# Patient Record
(demographics unavailable — no encounter records)

---

## 2024-12-18 NOTE — CONSULT LETTER
[Dear  ___] : Dear  [unfilled], [FreeTextEntry1] : I had the pleasure of evaluating your patient, JENIFFER GOFF , in the office today.  Please review my consultation and evaluation report that follows below.  Please do not hesitate to call me if further information is necessary or if you wish to discuss ongoing care or diagnostic work-up.    I very much appreciate your referral and it is a privilege to be able to provide care for your patient.  Sincerely,   Jose Terrazas MD, MHCM, FACP, JAYANT-C Pulmonary Medicine  of Medicine [place] nataliya [facility] of Medicine at [facility] [email or web address] in [address] [place]  [address]    Fax   [facility] at [address]  [phone]

## 2024-12-18 NOTE — HISTORY OF PRESENT ILLNESS
[TextBox_4] : 69 yo woman with COPD, currently treated with symbicort and albuterol Also has AICD, cardiomyopathy and recent visit to [facility] for dyspnea and dizziness After last seen, patient was evaluated and stablized by cardiology and EP for bradycardia  Last here in June and was doing well Saw cards last in August, for a new visit soon  Using symbicort regularly 2x2, and uses albuterol perhaps once a day  Generally, good spirits and doing well Her daughter is pleased with her course No cough sputum or chest pain  Imp 69 yo woman with underlying cardiolmyopathy and AICD Asthma/COPD on symbicort and she is stable Renew meds and RTC in 6 months

## 2024-12-18 NOTE — ASSESSMENT
1. The patient was informed that the symptoms are most consistent with an anal fissure. Typically, fissures that have been present for less than 4-6 weeks will respond to conservative measures. For fissures that have been present for greater than this time, surgical intervention is often necessary to achieve complete healing. There are two surgical treatment options (lateral internal sphincterotomy versus Botox injection of anal fissure)   2. I recommend continuing a High fiber diet (30 g of  fiber daily) that is rich in whole grain oats, raw leafy greens, fruits and vegetables  3.  The patient was encouraged to increase water intake to 64 ounces of water daily  4.  I recommend continue Benefiber 1 tablespoon with 8 ounces of water twice a day  5. Warm Sitz baths as needed  6.  The patient was provided a prescription for diltiazem 3%/lidocaine 5% to apply to the affected area with the gloved finger 3 times a day  7.  The patient was instructed to return to clinic as needed      Carlos Barnett MD [facility] FASCR[facility]  Colon and Rectal Surgery, Department of Surgery  Surgical Director of Digestive Health  [address]; [address] [place] [id number]  Ph: [phone]  Fax: [phone]     [FreeTextEntry1] : 67 yo woman with COPD, currently treated with symbicort and albuterol Also has AICD, cardiomyopathy and recent visit to [facility] for dyspnea and dizziness After last seen, patient was evaluated and stablized by cardiology and EP for bradycardia  Last here in June and was doing well Saw cards last in August, for a new visit soon  Using symbicort regularly 2x2, and uses albuterol perhaps once a day  Generally, good spirits and doing well Her daughter is pleased with her course No cough sputum or chest pain